# Patient Record
Sex: MALE | Race: ASIAN | NOT HISPANIC OR LATINO | Employment: PART TIME | URBAN - METROPOLITAN AREA
[De-identification: names, ages, dates, MRNs, and addresses within clinical notes are randomized per-mention and may not be internally consistent; named-entity substitution may affect disease eponyms.]

---

## 2018-06-18 ENCOUNTER — HOSPITAL ENCOUNTER (EMERGENCY)
Facility: HOSPITAL | Age: 53
Discharge: HOME | End: 2018-06-18
Attending: EMERGENCY MEDICINE

## 2018-06-18 ENCOUNTER — OFFICE VISIT (OUTPATIENT)
Dept: OPHTHALMOLOGY | Facility: CLINIC | Age: 53
End: 2018-06-18

## 2018-06-18 VITALS
TEMPERATURE: 98 F | OXYGEN SATURATION: 100 % | WEIGHT: 155 LBS | HEIGHT: 64 IN | HEART RATE: 87 BPM | DIASTOLIC BLOOD PRESSURE: 72 MMHG | RESPIRATION RATE: 18 BRPM | BODY MASS INDEX: 26.46 KG/M2 | SYSTOLIC BLOOD PRESSURE: 132 MMHG

## 2018-06-18 DIAGNOSIS — S05.11XA TRAUMATIC HYPHEMA OF RIGHT EYE, INITIAL ENCOUNTER: Primary | ICD-10-CM

## 2018-06-18 DIAGNOSIS — H21.01 HYPHEMA, RIGHT EYE: Primary | ICD-10-CM

## 2018-06-18 DIAGNOSIS — R11.2 NON-INTRACTABLE VOMITING WITH NAUSEA, UNSPECIFIED VOMITING TYPE: ICD-10-CM

## 2018-06-18 PROCEDURE — 99212 OFFICE O/P EST SF 10 MIN: CPT | Mod: PBBFAC,25,PO | Performed by: OPHTHALMOLOGY

## 2018-06-18 PROCEDURE — 99999 PR PBB SHADOW E&M-EST. PATIENT-LVL II: CPT | Mod: PBBFAC,,, | Performed by: OPHTHALMOLOGY

## 2018-06-18 PROCEDURE — 99284 EMERGENCY DEPT VISIT MOD MDM: CPT | Mod: 25,27

## 2018-06-18 PROCEDURE — 63600175 PHARM REV CODE 636 W HCPCS: Performed by: EMERGENCY MEDICINE

## 2018-06-18 PROCEDURE — 25000003 PHARM REV CODE 250: Performed by: EMERGENCY MEDICINE

## 2018-06-18 PROCEDURE — 92002 INTRM OPH EXAM NEW PATIENT: CPT | Mod: S$PBB,,, | Performed by: OPHTHALMOLOGY

## 2018-06-18 RX ORDER — ATROPINE SULFATE 10 MG/ML
1 SOLUTION/ DROPS OPHTHALMIC 3 TIMES DAILY
Qty: 5 ML | Refills: 0 | Status: SHIPPED | OUTPATIENT
Start: 2018-06-18

## 2018-06-18 RX ORDER — PREDNISOLONE ACETATE 10 MG/ML
1 SUSPENSION/ DROPS OPHTHALMIC 4 TIMES DAILY
Qty: 5 ML | Refills: 0 | Status: SHIPPED | OUTPATIENT
Start: 2018-06-18 | End: 2018-06-18 | Stop reason: SDUPTHER

## 2018-06-18 RX ORDER — PREDNISOLONE ACETATE 10 MG/ML
1 SUSPENSION/ DROPS OPHTHALMIC 4 TIMES DAILY
Qty: 5 ML | Refills: 0 | Status: SHIPPED | OUTPATIENT
Start: 2018-06-18 | End: 2019-06-18

## 2018-06-18 RX ORDER — VIT C/E/ZN/COPPR/LUTEIN/ZEAXAN 250MG-90MG
1 CAPSULE ORAL DAILY
COMMUNITY

## 2018-06-18 RX ORDER — SIMVASTATIN 40 MG/1
40 TABLET, FILM COATED ORAL NIGHTLY
COMMUNITY

## 2018-06-18 RX ORDER — ATROPINE SULFATE 10 MG/ML
1 SOLUTION/ DROPS OPHTHALMIC 3 TIMES DAILY
Qty: 5 ML | Refills: 0 | Status: SHIPPED | OUTPATIENT
Start: 2018-06-18 | End: 2018-06-18 | Stop reason: SDUPTHER

## 2018-06-18 RX ORDER — TIMOLOL MALEATE 5 MG/ML
1 SOLUTION/ DROPS OPHTHALMIC 2 TIMES DAILY
Qty: 5 ML | Refills: 1 | Status: SHIPPED | OUTPATIENT
Start: 2018-06-18 | End: 2018-06-18 | Stop reason: SDUPTHER

## 2018-06-18 RX ORDER — PROMETHAZINE HYDROCHLORIDE 25 MG/1
25 TABLET ORAL EVERY 6 HOURS PRN
Qty: 30 TABLET | Refills: 1 | Status: SHIPPED | OUTPATIENT
Start: 2018-06-18

## 2018-06-18 RX ORDER — TIMOLOL MALEATE 5 MG/ML
1 SOLUTION/ DROPS OPHTHALMIC 2 TIMES DAILY
Qty: 5 ML | Refills: 1 | Status: SHIPPED | OUTPATIENT
Start: 2018-06-18 | End: 2019-06-18

## 2018-06-18 RX ORDER — PROPARACAINE HYDROCHLORIDE 5 MG/ML
SOLUTION/ DROPS OPHTHALMIC
Status: DISCONTINUED
Start: 2018-06-18 | End: 2018-06-18 | Stop reason: HOSPADM

## 2018-06-18 RX ORDER — ACETAMINOPHEN 500 MG
5000 TABLET ORAL DAILY
COMMUNITY

## 2018-06-18 RX ORDER — PROPARACAINE HYDROCHLORIDE 5 MG/ML
1 SOLUTION/ DROPS OPHTHALMIC
Status: COMPLETED | OUTPATIENT
Start: 2018-06-18 | End: 2018-06-18

## 2018-06-18 RX ADMIN — PROPARACAINE HYDROCHLORIDE 1 DROP: 5 SOLUTION/ DROPS OPHTHALMIC at 02:06

## 2018-06-18 RX ADMIN — FLUORESCEIN SODIUM AND BENOXINATE HYDROCHLORIDE 1 DROP: 4; 2.5 SOLUTION OPHTHALMIC at 03:06

## 2018-06-18 NOTE — ED PROVIDER NOTES
Encounter Date: 6/18/2018    SCRIBE #1 NOTE: I, Blaise Sanabria, am scribing for, and in the presence of, Dr. Sands.       History     Chief Complaint   Patient presents with    Eye Injury     hit in R eye when pulling on bungee cord this afternoon.     06/18/2018 2:00 PM     Chief complaint: Loss of vision to the right eye    Anuj Kaiser is a 53 y.o. male who has no past medical history on file.    The patient presents to the ED with an acute onset of a loss of vision to the right eye with associated right eye pain. He reports that he pulled a bungee cord that hit him in the right eye 30 minutes prior to arrival. He states that he can only see waves of water and occasional flashes of light. At first, the pain was very intense but is has tailed off to a 6/10 since the incident. He denies any photophobia or any drug allergies. He adds that he is currently taking medication for high cholesterol and that his last tetanus was 2 months ago. No pertinent Shx noted. He presents with no other acute complaints.         The history is provided by the patient.     Review of patient's allergies indicates:  No Known Allergies  History reviewed. No pertinent past medical history.  History reviewed. No pertinent surgical history.  History reviewed. No pertinent family history.  Social History   Substance Use Topics    Smoking status: Never Smoker    Smokeless tobacco: Not on file    Alcohol use Not on file     Review of Systems   Constitutional: Negative for chills, fatigue and fever.        Denies generalized weakness.   HENT: Negative for ear pain, rhinorrhea and sore throat.    Eyes: Positive for pain (right eye) and visual disturbance. Negative for photophobia and discharge.        He can only see waves of water and occasional flashes of light.   Respiratory: Negative for cough, shortness of breath and wheezing.         Denies orthopnea. Denies dyspnea on exertion.   Cardiovascular: Negative for chest pain.    Gastrointestinal: Negative for abdominal pain, blood in stool, constipation, diarrhea, nausea and vomiting.        Denies melena. Denies hematochezia.   Genitourinary: Negative for dysuria and hematuria.        Denies swelling. Denies pelvic pain.   Musculoskeletal: Negative for back pain, joint swelling and neck pain.        Denies extremity pain.   Skin: Negative for pallor and rash.        Denies lesions.   Neurological: Negative for syncope, numbness and headaches.        Denies head trauma. No focal weakness.   Psychiatric/Behavioral: Negative for hallucinations and suicidal ideas.        Denies depression. Denies homicidal ideation. Denies auditory or visual hallucinations.       Physical Exam     Vitals:    06/18/18 1717   BP: 132/72   Pulse: 87   Resp: 18   Temp:       Physical Exam    Nursing note and vitals reviewed.  Constitutional: He appears well-developed and well-nourished. He is not diaphoretic. He appears distressed.   He is mildly distressed with pain.   HENT:   Head: Normocephalic and atraumatic.   Eyes: EOM are normal. Right pupil is not reactive.   He has mild right periorbital erythema and swelling. He has an injected sclera on the right. His right pupil is 6 mm and fixed. He has no consensual light reflex pain. There is a small right-sided hyphema. Intraocular pressure is deferred due to concern for possible globe injury or pressure.    3:10 PM  On fluorescein exam, the patient has no evidence of fluid leak. Intraocular pressure on tonopen exam was 23,19,22. There is no evidence of a ruptured globe so far on my exam.   Neck: Normal range of motion. Neck supple.   Cardiovascular: Normal rate, regular rhythm, normal heart sounds and intact distal pulses. Exam reveals no gallop and no friction rub.    No murmur heard.  Pulmonary/Chest: Breath sounds normal. No respiratory distress. He has no wheezes. He has no rhonchi. He has no rales.   Abdominal: Soft. He exhibits no distension. There is no  tenderness. There is no rebound and no guarding.   Musculoskeletal: Normal range of motion. He exhibits no edema or tenderness.   Neurological: He is alert and oriented to person, place, and time. He has normal strength.   Skin: Skin is warm and dry. No rash noted.   There are no abrasions or lacerations.         ED Course   Procedures  Labs Reviewed - No data to display       CT Orbits Sella Post Fossa Without Cont   Final Result      No evidence of orbital fracture, orbital rim fracture or blow-out fracture.  Pre-existing mild maxillary sinusitis.  Moderate nasal septal deviation to the right.         Electronically signed by: Reynold Yang MD   Date:    06/18/2018   Time:    15:24        X-Rays:   Independently Interpreted Readings:   Other Readings:  CT of orbits:  There is no evidence of fracture. The orbital contents are within normal limits with intact globe and normally sized optic nerves.    Medical Decision Making:   History:   Old Medical Records: I decided to obtain old medical records.  Initial Assessment:   This is an emergent evaluation. My differential diagnosis is a ruptured globe, corneal abrasion, traumatic iritis, and hyphema. The patient is up-to-date on her tetanus shot. Because of hyphema, fixed pupil, and significant decrease in vision, opthalmology will be consulted emergently. I will reassess.  Clinical Tests:   Radiological Study: Ordered and Reviewed  ED Management:  2:12 PM  The local opthamologist, Dr. Hoffmann, has been paged.    2:17 PM  Despite not being on call, Dr. Hoffmann was willing to speak with me and made the following recommendations: to gently check IOP and then evaluate with fluorescein to assess for open globe. If the scan is equivocal, she recommends a CT scan of the orbits.    3:35 PM  My workup is consistent with a traumatic hyphema without any other signs of injury at this time. I will discuss further with opthalmology.     3:54 PM  Dr. Hoffmann is currently at the  bedside of the patient evaluating him.    4:19 PM  Dr. Hoffmann has evaluated the patient up to her capabilities in the ED. She would like the patient discharged from the ED and sent directly to her clinic for a more thorough assessment. I believe this is in the best interest of the patient. She is working on this from a logistical perspective at this time.    4:44 PM  The opthalmologist would like the patient to be discharged at this time to go directly to her office for further evaluation.            Scribe Attestation:   Scribe #1: I performed the above scribed service and the documentation accurately describes the services I performed. I attest to the accuracy of the note.    I, Dr. Todd Sands, personally performed the services described in this documentation. All medical record entries made by the scribe were at my direction and in my presence.  I have reviewed the chart and agree that the record reflects my personal performance and is accurate and complete. Todd Sands MD.  5:49 PM 06/18/2018             Clinical Impression:     1. Hyphema, right eye            Disposition:   Disposition: Discharged  Condition: Stable                        Todd Sands MD  06/18/18 1743

## 2018-06-18 NOTE — PROGRESS NOTES
ED consult, Dr. Sands    Pt was loading sheet rock. William cord came loose, end of it hit him in eye. No glasses at the time. No known prior eye disease, denies previous eye exam. Noted sudden loss of vision, feels he sees blood and watery waves.    VAsc   OD CF at face  OS 20/20    Tp  OD 20  OS 13          Assessment /Plan     For exam results, see Encounter Report.    Traumatic hyphema of right eye, initial encounter  -     prednisoLONE acetate (PRED FORTE) 1 % DrpS; Place 1 drop into the right eye 4 (four) times daily.  Dispense: 5 mL; Refill: 0  -     atropine 1% (ISOPTO ATROPINE) 1 % Drop; Place 1 drop into the right eye 3 (three) times daily.  Dispense: 5 mL; Refill: 0  -     timolol maleate 0.5% (TIMOPTIC) 0.5 % Drop; Place 1 drop into the right eye 2 (two) times daily.  Dispense: 5 mL; Refill: 1    Non-intractable vomiting with nausea, unspecified vomiting type  -     promethazine (PHENERGAN) 25 MG tablet; Take 1 tablet (25 mg total) by mouth every 6 (six) hours as needed for Nausea.  Dispense: 30 tablet; Refill: 1    Other orders  -     Discontinue: atropine 1% (ISOPTO ATROPINE) 1 % Drop; Place 1 drop into the right eye 3 (three) times daily.  Dispense: 5 mL; Refill: 0  -     Discontinue: prednisoLONE acetate (PRED FORTE) 1 % DrpS; Place 1 drop into the right eye 4 (four) times daily.  Dispense: 5 mL; Refill: 0  -     Discontinue: timolol maleate 0.5% (TIMOPTIC) 0.5 % Drop; Place 1 drop into the right eye 2 (two) times daily.  Dispense: 5 mL; Refill: 1          No RD noted on B-scan.   Bed rest, elevate head of bed 30-45 degrees.  F/u 1 day IOP check.

## 2018-06-18 NOTE — PATIENT INSTRUCTIONS
Bedrest, keep head of bed elevated 30-45 degrees, sleeping in reclining chair is ok.    Do not rub eye.

## 2018-06-19 ENCOUNTER — OFFICE VISIT (OUTPATIENT)
Dept: OPHTHALMOLOGY | Facility: CLINIC | Age: 53
End: 2018-06-19

## 2018-06-19 DIAGNOSIS — H05.20 OCULAR PROPTOSIS: ICD-10-CM

## 2018-06-19 DIAGNOSIS — H40.051 OCULAR HYPERTENSION OF RIGHT EYE: ICD-10-CM

## 2018-06-19 DIAGNOSIS — S05.11XD TRAUMATIC HYPHEMA OF RIGHT EYE, SUBSEQUENT ENCOUNTER: Primary | ICD-10-CM

## 2018-06-19 PROCEDURE — 92012 INTRM OPH EXAM EST PATIENT: CPT | Mod: S$PBB,,, | Performed by: OPHTHALMOLOGY

## 2018-06-19 PROCEDURE — 99213 OFFICE O/P EST LOW 20 MIN: CPT | Mod: PBBFAC,PO | Performed by: OPHTHALMOLOGY

## 2018-06-19 PROCEDURE — 99999 PR PBB SHADOW E&M-EST. PATIENT-LVL III: CPT | Mod: PBBFAC,,, | Performed by: OPHTHALMOLOGY

## 2018-06-19 RX ORDER — PREDNISONE 20 MG/1
20 TABLET ORAL 2 TIMES DAILY
Qty: 30 TABLET | Refills: 0 | Status: SHIPPED | OUTPATIENT
Start: 2018-06-19 | End: 2018-07-04

## 2018-06-19 RX ORDER — ACETAZOLAMIDE 250 MG/1
250 TABLET ORAL 3 TIMES DAILY
Qty: 90 TABLET | Refills: 11 | Status: SHIPPED | OUTPATIENT
Start: 2018-06-19 | End: 2019-06-19

## 2018-06-19 NOTE — PROGRESS NOTES
"HPI     Eye Injury    Additional comments: od           Comments   F/u ER visit for right eye trauma , states he did sleep with head elevated   last night, states od very swollen and tearing states he threw up a lot   yesterday and feels that may have caused more swelling and pressure in his   right eye no vomitting today at all Pain Level at 5 per pt. States he is   using all eye gtts as directed. Pred, Atropine, Timolol ...     Agree with above. Threw up multiple times yesterday. Got home, drank some   water, threw up 1 more time then none after that. Vision still looks like   "jellyfish".       Last edited by Shantell Hoffmann MD on 6/19/2018  2:52 PM.   (History)            Assessment /Plan     For exam results, see Encounter Report.    Traumatic hyphema of right eye, subsequent encounter  -     CT Orbits Sella Post Fossa W Wo Contrast; Future; Expected date: 06/19/2018    Ocular proptosis  -     CT Orbits Sella Post Fossa W Wo Contrast; Future; Expected date: 06/19/2018    Ocular hypertension of right eye  -     CT Orbits Sella Post Fossa W Wo Contrast; Future; Expected date: 06/19/2018    Other orders  -     predniSONE (DELTASONE) 20 MG tablet; Take 1 tablet (20 mg total) by mouth 2 (two) times daily.  Dispense: 30 tablet; Refill: 0  -     acetaZOLAMIDE (DIAMOX) 250 MG tablet; Take 1 tablet (250 mg total) by mouth 3 (three) times daily.  Dispense: 90 tablet; Refill: 11          Today with new proptosis and sub-meka heme after emesis yesterday. Did not take Phenergan, as he states the nausea resolved after 1 episode at home. There is some concern for retrobulbar hemorrhage, but IOP began coming down in office after PO Diamox. Motility is preserved and no evidence of APD OD at this time. Will order repeat CT scan due to change in status with ~3mm proptosis (no change noted on B-scan). Discussed case with Dr. Dye - if worsens, will perform lateral canthotomy/cantholysis. Rx given for PO Diamox and " Prednisone, instructed to also take OTC PPI with steroid. Will check again tomorrow, pt instructed to call [facility] and ask for eye doctor on call (me) if anything worsens.

## 2018-06-20 ENCOUNTER — TELEPHONE (OUTPATIENT)
Dept: OPHTHALMOLOGY | Facility: CLINIC | Age: 53
End: 2018-06-20

## 2018-06-20 ENCOUNTER — OFFICE VISIT (OUTPATIENT)
Dept: OPHTHALMOLOGY | Facility: CLINIC | Age: 53
End: 2018-06-20

## 2018-06-20 DIAGNOSIS — H05.20 OCULAR PROPTOSIS: ICD-10-CM

## 2018-06-20 DIAGNOSIS — S05.11XD TRAUMATIC HYPHEMA OF RIGHT EYE, SUBSEQUENT ENCOUNTER: Primary | ICD-10-CM

## 2018-06-20 DIAGNOSIS — H40.051 OCULAR HYPERTENSION OF RIGHT EYE: ICD-10-CM

## 2018-06-20 PROCEDURE — 99999 PR PBB SHADOW E&M-EST. PATIENT-LVL I: CPT | Mod: PBBFAC,,, | Performed by: OPHTHALMOLOGY

## 2018-06-20 PROCEDURE — 92012 INTRM OPH EXAM EST PATIENT: CPT | Mod: S$PBB,,, | Performed by: OPHTHALMOLOGY

## 2018-06-20 PROCEDURE — 99211 OFF/OP EST MAY X REQ PHY/QHP: CPT | Mod: PBBFAC,PO | Performed by: OPHTHALMOLOGY

## 2018-06-20 RX ORDER — DORZOLAMIDE HCL 20 MG/ML
1 SOLUTION/ DROPS OPHTHALMIC 3 TIMES DAILY
Qty: 10 ML | Refills: 1 | Status: SHIPPED | OUTPATIENT
Start: 2018-06-20 | End: 2018-06-20 | Stop reason: SDUPTHER

## 2018-06-20 RX ORDER — DORZOLAMIDE HCL 20 MG/ML
1 SOLUTION/ DROPS OPHTHALMIC 3 TIMES DAILY
Qty: 10 ML | Refills: 1 | Status: SHIPPED | OUTPATIENT
Start: 2018-06-20 | End: 2019-06-20

## 2018-06-20 RX ORDER — BRIMONIDINE TARTRATE 2 MG/ML
1 SOLUTION/ DROPS OPHTHALMIC 3 TIMES DAILY
Qty: 5 ML | Refills: 1 | Status: SHIPPED | OUTPATIENT
Start: 2018-06-20 | End: 2018-06-20 | Stop reason: SDUPTHER

## 2018-06-20 RX ORDER — BRIMONIDINE TARTRATE 2 MG/ML
1 SOLUTION/ DROPS OPHTHALMIC 3 TIMES DAILY
Qty: 5 ML | Refills: 1 | Status: SHIPPED | OUTPATIENT
Start: 2018-06-20 | End: 2019-06-20

## 2018-06-20 NOTE — PROGRESS NOTES
HPI     Agree with above. Feels much better since starting acetazolamide,   starting to see better. Did not go for CT scan. Last diamox was at noon.   Has no pain right now. No more nausea since yesterday.     Last edited by Shantell Hoffmann MD on 6/20/2018  3:24 PM.   (History)            Assessment /Plan     For exam results, see Encounter Report.    Traumatic hyphema of right eye, subsequent encounter    Ocular proptosis    Ocular hypertension of right eye    Other orders  -     brimonidine 0.2% (ALPHAGAN) 0.2 % Drop; Place 1 drop into the right eye 3 (three) times daily.  Dispense: 5 mL; Refill: 1  -     dorzolamide (TRUSOPT) 2 % ophthalmic solution; Place 1 drop into the right eye 3 (three) times daily.  Dispense: 10 mL; Refill: 1            Proptosis and sub-meka heme greatly improved with PO Acetazolamide and Prednisone. Motility is preserved and no evidence of APD OD at this time. Cancel repeat CT scan due to doing better. Will add topical Brimonidine TID and Dorzolamide TID (if he can get it), re-check IOP ~2 days to see if he can be weaned off Acetazolamide and Prednisone.

## 2018-06-20 NOTE — TELEPHONE ENCOUNTER
----- Message from Felicita Andujar sent at 6/20/2018  4:10 PM CDT -----  Contact: PT  Type: Needs Medical Advice    Who Called:  Anuj Kaiser   Symptoms (please be specific):  n/a  How long has patient had these symptoms:  n/a  Pharmacy name and phone #:    [id number] - [place] - [address]  [address] [address]  Phone: [phone] Fax: [phone]  Best Call Back Number:       [phone]   Additional Information:  Pt is calling to get his eye drop prescriptions sent to another pharmacy. brimonidine 0.2% (ALPHAGAN) 0.2 % Drop  &  dorzolamide (TRUSOPT) 2 % ophthalmic solution.  Please call back and advise.  
Called pt concerning eye drop rx. Pt says [facility] is closed til Monday and he needs his drops sent to [facility] at [address]. Told him I would send a message to Dr Delatorre and she will send today.   
normal...

## 2022-04-10 ENCOUNTER — HOSPITAL ENCOUNTER (EMERGENCY)
Facility: HOSPITAL | Age: 57
Discharge: HOME | End: 2022-04-10
Attending: EMERGENCY MEDICINE

## 2022-04-10 VITALS
OXYGEN SATURATION: 95 % | WEIGHT: 150 LBS | HEART RATE: 59 BPM | SYSTOLIC BLOOD PRESSURE: 112 MMHG | HEIGHT: 65 IN | DIASTOLIC BLOOD PRESSURE: 62 MMHG | TEMPERATURE: 99 F | RESPIRATION RATE: 14 BRPM | BODY MASS INDEX: 24.99 KG/M2

## 2022-04-10 DIAGNOSIS — R07.9 CHEST PAIN: ICD-10-CM

## 2022-04-10 DIAGNOSIS — G45.9 TIA (TRANSIENT ISCHEMIC ATTACK): ICD-10-CM

## 2022-04-10 DIAGNOSIS — R07.89 ATYPICAL CHEST PAIN: Primary | ICD-10-CM

## 2022-04-10 LAB
ALBUMIN SERPL BCP-MCNC: 3.9 G/DL (ref 3.5–5.2)
ALP SERPL-CCNC: 48 U/L (ref 55–135)
ALT SERPL W/O P-5'-P-CCNC: 33 U/L (ref 10–44)
ANION GAP SERPL CALC-SCNC: 8 MMOL/L (ref 8–16)
AST SERPL-CCNC: 27 U/L (ref 10–40)
BASOPHILS # BLD AUTO: 0.03 K/UL (ref 0–0.2)
BASOPHILS NFR BLD: 0.5 % (ref 0–1.9)
BILIRUB SERPL-MCNC: 1 MG/DL (ref 0.1–1)
BNP SERPL-MCNC: 17 PG/ML (ref 0–99)
BUN SERPL-MCNC: 16 MG/DL (ref 6–20)
CALCIUM SERPL-MCNC: 8.4 MG/DL (ref 8.7–10.5)
CHLORIDE SERPL-SCNC: 100 MMOL/L (ref 95–110)
CO2 SERPL-SCNC: 26 MMOL/L (ref 23–29)
CREAT SERPL-MCNC: 0.9 MG/DL (ref 0.5–1.4)
D DIMER PPP IA.FEU-MCNC: <0.27 UG/ML FEU
DIFFERENTIAL METHOD: ABNORMAL
EOSINOPHIL # BLD AUTO: 0.2 K/UL (ref 0–0.5)
EOSINOPHIL NFR BLD: 2.9 % (ref 0–8)
ERYTHROCYTE [DISTWIDTH] IN BLOOD BY AUTOMATED COUNT: 13.3 % (ref 11.5–14.5)
EST. GFR  (AFRICAN AMERICAN): >60 ML/MIN/1.73 M^2
EST. GFR  (NON AFRICAN AMERICAN): >60 ML/MIN/1.73 M^2
GLUCOSE SERPL-MCNC: 142 MG/DL (ref 70–110)
HCT VFR BLD AUTO: 42.8 % (ref 40–54)
HGB BLD-MCNC: 14.3 G/DL (ref 14–18)
IMM GRANULOCYTES # BLD AUTO: 0.02 K/UL (ref 0–0.04)
IMM GRANULOCYTES NFR BLD AUTO: 0.4 % (ref 0–0.5)
INR PPP: 1
LYMPHOCYTES # BLD AUTO: 1.5 K/UL (ref 1–4.8)
LYMPHOCYTES NFR BLD: 28.2 % (ref 18–48)
MCH RBC QN AUTO: 29.4 PG (ref 27–31)
MCHC RBC AUTO-ENTMCNC: 33.4 G/DL (ref 32–36)
MCV RBC AUTO: 88 FL (ref 82–98)
MONOCYTES # BLD AUTO: 0.6 K/UL (ref 0.3–1)
MONOCYTES NFR BLD: 10.8 % (ref 4–15)
NEUTROPHILS # BLD AUTO: 3.1 K/UL (ref 1.8–7.7)
NEUTROPHILS NFR BLD: 57.2 % (ref 38–73)
NRBC BLD-RTO: 0 /100 WBC
PLATELET # BLD AUTO: 245 K/UL (ref 150–450)
PMV BLD AUTO: 9.1 FL (ref 9.2–12.9)
POTASSIUM SERPL-SCNC: 3.6 MMOL/L (ref 3.5–5.1)
PROT SERPL-MCNC: 7 G/DL (ref 6–8.4)
PROTHROMBIN TIME: 12.4 SEC (ref 11.4–13.7)
RBC # BLD AUTO: 4.86 M/UL (ref 4.6–6.2)
SODIUM SERPL-SCNC: 134 MMOL/L (ref 136–145)
TROPONIN I SERPL DL<=0.01 NG/ML-MCNC: <0.03 NG/ML
WBC # BLD AUTO: 5.47 K/UL (ref 3.9–12.7)

## 2022-04-10 PROCEDURE — 85025 COMPLETE CBC W/AUTO DIFF WBC: CPT | Performed by: STUDENT IN AN ORGANIZED HEALTH CARE EDUCATION/TRAINING PROGRAM

## 2022-04-10 PROCEDURE — 93010 EKG 12-LEAD: ICD-10-PCS | Mod: ,,, | Performed by: INTERNAL MEDICINE

## 2022-04-10 PROCEDURE — 85379 FIBRIN DEGRADATION QUANT: CPT | Performed by: STUDENT IN AN ORGANIZED HEALTH CARE EDUCATION/TRAINING PROGRAM

## 2022-04-10 PROCEDURE — 25000003 PHARM REV CODE 250: Performed by: EMERGENCY MEDICINE

## 2022-04-10 PROCEDURE — 83880 ASSAY OF NATRIURETIC PEPTIDE: CPT | Performed by: STUDENT IN AN ORGANIZED HEALTH CARE EDUCATION/TRAINING PROGRAM

## 2022-04-10 PROCEDURE — 80053 COMPREHEN METABOLIC PANEL: CPT | Performed by: STUDENT IN AN ORGANIZED HEALTH CARE EDUCATION/TRAINING PROGRAM

## 2022-04-10 PROCEDURE — 84484 ASSAY OF TROPONIN QUANT: CPT | Performed by: STUDENT IN AN ORGANIZED HEALTH CARE EDUCATION/TRAINING PROGRAM

## 2022-04-10 PROCEDURE — 93010 ELECTROCARDIOGRAM REPORT: CPT | Mod: ,,, | Performed by: INTERNAL MEDICINE

## 2022-04-10 PROCEDURE — 85610 PROTHROMBIN TIME: CPT | Performed by: STUDENT IN AN ORGANIZED HEALTH CARE EDUCATION/TRAINING PROGRAM

## 2022-04-10 PROCEDURE — 99284 EMERGENCY DEPT VISIT MOD MDM: CPT | Mod: 25

## 2022-04-10 PROCEDURE — 93005 ELECTROCARDIOGRAM TRACING: CPT | Performed by: INTERNAL MEDICINE

## 2022-04-10 RX ORDER — NAPROXEN SODIUM 220 MG/1
81 TABLET, FILM COATED ORAL DAILY
Status: DISCONTINUED | OUTPATIENT
Start: 2022-04-10 | End: 2022-04-10 | Stop reason: HOSPADM

## 2022-04-10 RX ADMIN — ASPIRIN 81 MG 81 MG: 81 TABLET ORAL at 01:04

## 2022-04-10 NOTE — ED PROVIDER NOTES
Encounter Date: 4/10/2022       History     Chief Complaint   Patient presents with    Chest Pain     Patient reports constant substernal chest pain since 2100 last night     57-year-old male who has a history of hyperlipidemia, hypertension, presents emergency room with a history that he began having left precordial chest pain he is unable to qualify since 9:00 p.m. last night.  The patient states that the pain is worse with movement and respiration.  He denies any trauma changes in physical activity.  No associated nausea vomiting or diaphoresis.  No palpitations.  No similar past history.  He denies any coughing or sputum production has no dyspnea.  The patient additionally states that last week while driving he suddenly developed left-sided weakness lasting for 5-7 minutes.  During that time frame the patient had not taken his antihypertensive medication for 10 days.  Since that time though he has continued his prescribed antihypertensives as directed.  He has no prior history of CVA nor any prior history of coronary disease.        Review of patient's allergies indicates:  No Known Allergies  No past medical history on file.  No past surgical history on file.  No family history on file.  Social History     Tobacco Use    Smoking status: Never Smoker     Review of Systems   Constitutional: Negative for activity change, chills, diaphoresis and fever.   HENT: Negative for congestion, rhinorrhea, sinus pain and sore throat.    Respiratory: Negative for cough, chest tightness and shortness of breath.    Cardiovascular: Positive for chest pain. Negative for palpitations and leg swelling.   Gastrointestinal: Negative for abdominal pain, constipation, diarrhea, nausea and vomiting.   Genitourinary: Negative for difficulty urinating.   Skin: Negative for pallor, rash and wound.   Neurological: Positive for weakness. Negative for dizziness, speech difficulty and light-headedness.        Complaint of left hemiparesis 1  week ago   All other systems reviewed and are negative.      Physical Exam     Initial Vitals [04/10/22 0932]   BP Pulse Resp Temp SpO2   131/75 63 18 98.4 °F (36.9 °C) 97 %      MAP       --         Physical Exam    Vitals reviewed.  Constitutional: He appears well-developed and well-nourished. He is not diaphoretic. No distress.   HENT:   Head: Normocephalic and atraumatic.   Right Ear: External ear normal.   Left Ear: External ear normal.   Nose: Nose normal.   Mouth/Throat: Oropharynx is clear and moist.   Eyes: Conjunctivae and EOM are normal. Pupils are equal, round, and reactive to light.   Neck: Neck supple. No JVD present.   Normal range of motion.  Cardiovascular: Normal rate, regular rhythm, normal heart sounds and intact distal pulses. Exam reveals no gallop and no friction rub.    No murmur heard.  Pulmonary/Chest: Breath sounds normal. No respiratory distress. He has no wheezes. He has no rhonchi. He has no rales. He exhibits no tenderness.   Abdominal: Abdomen is soft. Bowel sounds are normal. He exhibits no distension and no mass. There is no abdominal tenderness. There is no rebound and no guarding.   Musculoskeletal:         General: No tenderness or edema. Normal range of motion.      Cervical back: Normal range of motion and neck supple.     Lymphadenopathy:     He has no cervical adenopathy.   Neurological: He is alert and oriented to person, place, and time. He has normal strength. No cranial nerve deficit or sensory deficit. GCS score is 15. GCS eye subscore is 4. GCS verbal subscore is 5. GCS motor subscore is 6.   Skin: Skin is warm and dry. Capillary refill takes less than 2 seconds. No rash noted. No erythema. No pallor.   Psychiatric: He has a normal mood and affect. His behavior is normal. Judgment and thought content normal.         ED Course   Procedures  Labs Reviewed   CBC W/ AUTO DIFFERENTIAL - Abnormal; Notable for the following components:       Result Value    MPV 9.1 (*)     All  other components within normal limits   COMPREHENSIVE METABOLIC PANEL - Abnormal; Notable for the following components:    Sodium 134 (*)     Glucose 142 (*)     Calcium 8.4 (*)     Alkaline Phosphatase 48 (*)     All other components within normal limits   B-TYPE NATRIURETIC PEPTIDE   TROPONIN I   PROTIME-INR   D DIMER, QUANTITATIVE   D DIMER, QUANTITATIVE        ECG Results          EKG 12-lead (In process)  Result time 04/10/22 09:44:08    In process by Interface, Lab In [facility] (04/10/22 09:44:08)                 Narrative:    Test Reason : R07.9,    Vent. Rate : 059 BPM     Atrial Rate : 059 BPM     P-R Int : 176 ms          QRS Dur : 086 ms      QT Int : 414 ms       P-R-T Axes : 057 037 032 degrees     QTc Int : 409 ms    Sinus bradycardia  Otherwise normal ECG  No previous ECGs available    Referred By:  RUTH MEDLEY           Confirmed By:                             Imaging Results          CT Head Without Contrast (Final result)  Result time 04/10/22 10:28:53    Final result by Koffi Pritchett MD (04/10/22 10:28:53)                 Narrative:    CMS MANDATED QUALITY DATA - CT RADIATION  436    All CT scans at this facility utilize dose modulation, iterative reconstruction, and/or weight based dosing when appropriate to reduce radiation dose to as low as reasonably achievable.    CT HEAD WITHOUT IV CONTRAST    CLINICAL HISTORY:  57 years Male TIA    COMPARISON: None    FINDINGS: Negative for acute intracranial hemorrhage, midline shift, or mass effect. Ventricles and sulci are normal in size. Gray-white differentiation is maintained. Bilateral basal ganglia calcification. Cerebellar hemispheres and brainstem are unremarkable.    No calvarial lesion or fracture. Mastoid air cells are clear. Mucous retention cyst or polyp inferior right maxillary sinus.    IMPRESSION:    No CT evidence of acute intracranial pathology.    Electronically signed by:  Koffi Pritchett MD  4/10/2022 10:28 AM CDT Workstation:  [id number]                             X-Ray Chest AP Portable (Final result)  Result time 04/10/22 10:00:16    Final result by Koffi Pritchett MD (04/10/22 10:00:16)                 Narrative:    XR CHEST 1 VIEW    CLINICAL HISTORY:  57 years Male chest pain    COMPARISON: None    FINDINGS: Cardiac silhouette size is within normal limits. No confluent airspace disease. No large pleural effusion or pneumothorax. No acute osseous abnormality.    IMPRESSION: No acute pulmonary process.    Electronically signed by:  Koffi Pritchett MD  4/10/2022 10:00 AM CDT Workstation: [id number]                               Medications   aspirin chewable tablet 81 mg (81 mg Oral Given 4/10/22 4581)                Attending Attestation:             Attending ED Notes:   This 57-year-old male who has hypertension which is well controlled at this time, had complaints of left-sided chest pain worse with movement and deep breaths.  Patient's D-dimer at this time is negative.  And his troponin is also negative.  The chest x-ray is clear.  EKG showed a sinus rhythm without any evidence of any ischemia ectopy.  No injury pattern.  The ventricular rate is 59. Because of his complaints of left-sided weakness that lasted 5-7 minutes a week ago a CT was done which was normal.  The patient has remained neurologically intact throughout his entire ED visit.  I did speak to Dr. Ortiz, neurology who suggested 1 baby aspirin a day and office follow-up for further neurologic workup.                 Clinical Impression:   Final diagnoses:  [R07.9] Chest pain  [R07.89] Atypical chest pain (Primary)  [G45.9] TIA (transient ischemic attack)          ED Disposition Condition    Discharge Stable        ED Prescriptions     None        Follow-up Information     Follow up With Specialties Details Why Contact Info    William Ortiz MD Neurology Schedule an appointment as soon as possible for a visit   [address] [place] [address]  [id number]  [phone]      Alfredo Garza MD Interventional Cardiology, Cardiology Schedule an appointment as soon as possible for a visit   [address] [address]  CARDIOLOGY [facility] [address]  [phone]             Pop Acevedo Jr., MD  04/10/22 0372

## 2022-04-10 NOTE — ED NOTES
Pt resting quietly. No acute distress noted. Denies any needs at this time. Will continue to monitor. On phone. Anxious for results

## 2022-04-10 NOTE — DISCHARGE INSTRUCTIONS
Continue present blood pressure medication.  Take 1 baby aspirin daily.  Follow-up with both Cardiology and Neurology.  Call the office for appointments.  Use ibuprofen if needed for any chest pain.  Neurologist is Dr. Ortiz and the cardiologist is Dr. FARHEEN Garza.  Return to the ER if needed.